# Patient Record
Sex: FEMALE | URBAN - METROPOLITAN AREA
[De-identification: names, ages, dates, MRNs, and addresses within clinical notes are randomized per-mention and may not be internally consistent; named-entity substitution may affect disease eponyms.]

---

## 2023-01-17 ENCOUNTER — APPOINTMENT (RX ONLY)
Dept: URBAN - METROPOLITAN AREA CLINIC 23 | Facility: CLINIC | Age: 33
Setting detail: DERMATOLOGY
End: 2023-01-17

## 2023-01-17 DIAGNOSIS — Z41.9 ENCOUNTER FOR PROCEDURE FOR PURPOSES OTHER THAN REMEDYING HEALTH STATE, UNSPECIFIED: ICD-10-CM

## 2023-01-17 PROCEDURE — ? BOTOX

## 2023-01-17 PROCEDURE — ? RECOMMENDATIONS

## 2023-01-17 NOTE — PROCEDURE: BOTOX
Right Pupillary Line Units: 0
Show Nasal Units: Yes
Additional Area 1 Units: 12
Detail Level: Detailed
Show Inventory Tab: Show
Additional Area 2 Location: lateral eyes
Show Ucl Units: No
Consent: Written consent obtained. Risks include but not limited to lid/brow ptosis, bruising, swelling, diplopia, temporary effect, incomplete chemical denervation. Explained product can take 10-14 days to become effective and is effective for 3 months.
Additional Area 2 Units: [address]
Dilution (U/0.1 Cc): 4
Topical Anesthesia?: 20% benzocaine, 8% lidocaine, 4% tetracaine
Post-Care Instructions: Patient instructed to not lie down for 4 hours and limit physical activity for 24 hours.
Length Of Topical Anesthesia Application (Optional): 15 minutes
Additional Area 1 Location: high forehead